# Patient Record
Sex: FEMALE | Race: WHITE
[De-identification: names, ages, dates, MRNs, and addresses within clinical notes are randomized per-mention and may not be internally consistent; named-entity substitution may affect disease eponyms.]

---

## 2020-10-09 ENCOUNTER — HOSPITAL ENCOUNTER (EMERGENCY)
Dept: HOSPITAL 95 - ER | Age: 70
Discharge: ANOTHER HOSPITAL | End: 2020-10-09
Payer: OTHER GOVERNMENT

## 2020-10-09 VITALS — WEIGHT: 214.99 LBS | HEIGHT: 60 IN | BODY MASS INDEX: 42.21 KG/M2

## 2020-10-09 DIAGNOSIS — Z88.0: ICD-10-CM

## 2020-10-09 DIAGNOSIS — Z79.899: ICD-10-CM

## 2020-10-09 DIAGNOSIS — Z20.828: ICD-10-CM

## 2020-10-09 DIAGNOSIS — R74.01: ICD-10-CM

## 2020-10-09 DIAGNOSIS — Z88.5: ICD-10-CM

## 2020-10-09 DIAGNOSIS — Z79.82: ICD-10-CM

## 2020-10-09 DIAGNOSIS — E11.9: ICD-10-CM

## 2020-10-09 DIAGNOSIS — Z88.1: ICD-10-CM

## 2020-10-09 DIAGNOSIS — K83.8: Primary | ICD-10-CM

## 2020-10-09 DIAGNOSIS — E11.10: ICD-10-CM

## 2020-10-09 LAB
ALBUMIN SERPL BCP-MCNC: 3.2 G/DL (ref 3.4–5)
ALBUMIN/GLOB SERPL: 0.8 {RATIO} (ref 0.8–1.8)
ALT SERPL W P-5'-P-CCNC: 796 U/L (ref 12–78)
ANION GAP SERPL CALCULATED.4IONS-SCNC: 21 MMOL/L (ref 6–16)
ANION GAP SERPL CALCULATED.4IONS-SCNC: 21 MMOL/L (ref 6–16)
AST SERPL W P-5'-P-CCNC: 612 U/L (ref 12–37)
BASOPHILS # BLD AUTO: 0.05 K/MM3 (ref 0–0.23)
BASOPHILS NFR BLD AUTO: 1 % (ref 0–2)
BILIRUB SERPL-MCNC: 3.7 MG/DL (ref 0.1–1)
BILIRUB UR QL STRIP: (no result)
BUN SERPL-MCNC: 33 MG/DL (ref 8–24)
BUN SERPL-MCNC: 34 MG/DL (ref 8–24)
CALCIUM SERPL-MCNC: 8.6 MG/DL (ref 8.5–10.1)
CALCIUM SERPL-MCNC: 9.4 MG/DL (ref 8.5–10.1)
CHLORIDE SERPL-SCNC: 104 MMOL/L (ref 98–108)
CHLORIDE SERPL-SCNC: 109 MMOL/L (ref 98–108)
CO2 SERPL-SCNC: 11 MMOL/L (ref 21–32)
CO2 SERPL-SCNC: 9 MMOL/L (ref 21–32)
CREAT SERPL-MCNC: 0.79 MG/DL (ref 0.4–1)
CREAT SERPL-MCNC: 0.85 MG/DL (ref 0.4–1)
DEPRECATED RDW RBC AUTO: 48.8 FL (ref 35.1–46.3)
EOSINOPHIL # BLD AUTO: 0.01 K/MM3 (ref 0–0.68)
EOSINOPHIL NFR BLD AUTO: 0 % (ref 0–6)
ERYTHROCYTE [DISTWIDTH] IN BLOOD BY AUTOMATED COUNT: 13.2 % (ref 11.7–14.2)
GLOBULIN SER CALC-MCNC: 4 G/DL (ref 2.2–4)
GLUCOSE SERPL-MCNC: 404 MG/DL (ref 70–99)
GLUCOSE SERPL-MCNC: 418 MG/DL (ref 70–99)
GLUCOSE UR-MCNC: (no result) MG/DL
HCT VFR BLD AUTO: 40.6 % (ref 33–51)
HGB BLD-MCNC: 12.1 G/DL (ref 11.5–16)
IMM GRANULOCYTES # BLD AUTO: 0.14 K/MM3 (ref 0–0.1)
IMM GRANULOCYTES NFR BLD AUTO: 2 % (ref 0–1)
KETONES UR STRIP-MCNC: (no result) MG/DL
LYMPHOCYTES # BLD AUTO: 0.78 K/MM3 (ref 0.84–5.2)
LYMPHOCYTES NFR BLD AUTO: 13 % (ref 21–46)
MCHC RBC AUTO-ENTMCNC: 29.8 G/DL (ref 31.5–36.5)
MCV RBC AUTO: 100 FL (ref 80–100)
MONOCYTES # BLD AUTO: 0.33 K/MM3 (ref 0.16–1.47)
MONOCYTES NFR BLD AUTO: 6 % (ref 4–13)
NEUTROPHILS # BLD AUTO: 4.73 K/MM3 (ref 1.96–9.15)
NEUTROPHILS NFR BLD AUTO: 78 % (ref 41–73)
NRBC # BLD AUTO: 0 K/MM3 (ref 0–0.02)
NRBC BLD AUTO-RTO: 0 /100 WBC (ref 0–0.2)
PLATELET # BLD AUTO: 266 K/MM3 (ref 150–400)
POTASSIUM SERPL-SCNC: 4.8 MMOL/L (ref 3.5–5.5)
POTASSIUM SERPL-SCNC: 5 MMOL/L (ref 3.5–5.5)
PROT SERPL-MCNC: 7.2 G/DL (ref 6.4–8.2)
PROT UR STRIP-MCNC: (no result) MG/DL
RBC #/AREA URNS HPF: (no result) /HPF (ref 0–2)
SODIUM SERPL-SCNC: 136 MMOL/L (ref 136–145)
SODIUM SERPL-SCNC: 139 MMOL/L (ref 136–145)
SP GR SPEC: 1.02 (ref 1–1.02)
UROBILINOGEN UR STRIP-MCNC: (no result) MG/DL
WBC #/AREA URNS HPF: (no result) /HPF (ref 0–5)

## 2021-01-18 ENCOUNTER — HOSPITAL ENCOUNTER (INPATIENT)
Dept: HOSPITAL 95 - ER | Age: 71
LOS: 4 days | Discharge: HOME HEALTH | DRG: 315 | End: 2021-01-22
Attending: INTERNAL MEDICINE | Admitting: HOSPITALIST
Payer: MEDICARE

## 2021-01-18 VITALS — WEIGHT: 179.9 LBS | HEIGHT: 60 IN | BODY MASS INDEX: 35.32 KG/M2

## 2021-01-18 DIAGNOSIS — C78.89: ICD-10-CM

## 2021-01-18 DIAGNOSIS — J44.9: ICD-10-CM

## 2021-01-18 DIAGNOSIS — I50.22: ICD-10-CM

## 2021-01-18 DIAGNOSIS — E11.22: ICD-10-CM

## 2021-01-18 DIAGNOSIS — Z87.891: ICD-10-CM

## 2021-01-18 DIAGNOSIS — I42.0: ICD-10-CM

## 2021-01-18 DIAGNOSIS — I13.0: ICD-10-CM

## 2021-01-18 DIAGNOSIS — N18.9: ICD-10-CM

## 2021-01-18 DIAGNOSIS — I25.2: ICD-10-CM

## 2021-01-18 DIAGNOSIS — T82.118A: Primary | ICD-10-CM

## 2021-01-18 DIAGNOSIS — Z79.4: ICD-10-CM

## 2021-01-18 DIAGNOSIS — I48.20: ICD-10-CM

## 2021-01-18 DIAGNOSIS — I70.90: ICD-10-CM

## 2021-01-18 LAB
ALBUMIN SERPL BCP-MCNC: 2 G/DL (ref 3.4–5)
ALBUMIN/GLOB SERPL: 0.6 {RATIO} (ref 0.8–1.8)
ALT SERPL W P-5'-P-CCNC: 11 U/L (ref 12–78)
ANION GAP SERPL CALCULATED.4IONS-SCNC: 5 MMOL/L (ref 6–16)
ANION GAP SERPL CALCULATED.4IONS-SCNC: 9 MMOL/L (ref 6–16)
AST SERPL W P-5'-P-CCNC: 18 U/L (ref 12–37)
BASOPHILS # BLD: 0 K/MM3 (ref 0–0.23)
BASOPHILS NFR BLD: 0 % (ref 0–2)
BILIRUB SERPL-MCNC: 0.7 MG/DL (ref 0.1–1)
BLASTS NFR BLD MANUAL: 1 % (ref 0–0)
BUN SERPL-MCNC: 19 MG/DL (ref 8–24)
BUN SERPL-MCNC: 20 MG/DL (ref 8–24)
CALCIUM SERPL-MCNC: 8.1 MG/DL (ref 8.5–10.1)
CALCIUM SERPL-MCNC: 8.4 MG/DL (ref 8.5–10.1)
CHLORIDE SERPL-SCNC: 105 MMOL/L (ref 98–108)
CHLORIDE SERPL-SCNC: 106 MMOL/L (ref 98–108)
CO2 SERPL-SCNC: 26 MMOL/L (ref 21–32)
CO2 SERPL-SCNC: 28 MMOL/L (ref 21–32)
CREAT SERPL-MCNC: 0.77 MG/DL (ref 0.4–1)
CREAT SERPL-MCNC: 0.81 MG/DL (ref 0.4–1)
DEPRECATED RDW RBC AUTO: 58.8 FL (ref 35.1–46.3)
DEPRECATED RDW RBC AUTO: 60.4 FL (ref 35.1–46.3)
EOSINOPHIL # BLD: 0 K/MM3 (ref 0–0.68)
EOSINOPHIL NFR BLD: 0 % (ref 0–6)
ERYTHROCYTE [DISTWIDTH] IN BLOOD BY AUTOMATED COUNT: 16.8 % (ref 11.7–14.2)
ERYTHROCYTE [DISTWIDTH] IN BLOOD BY AUTOMATED COUNT: 17 % (ref 11.7–14.2)
GLOBULIN SER CALC-MCNC: 3.1 G/DL (ref 2.2–4)
GLUCOSE SERPL-MCNC: 160 MG/DL (ref 70–99)
GLUCOSE SERPL-MCNC: 177 MG/DL (ref 70–99)
HCT VFR BLD AUTO: 17.7 % (ref 33–51)
HCT VFR BLD AUTO: 21.2 % (ref 33–51)
HGB BLD-MCNC: 5.6 G/DL (ref 11.5–16)
HGB BLD-MCNC: 6.7 G/DL (ref 11.5–16)
LYMPHOCYTES # BLD: 0.63 K/MM3 (ref 0.84–5.2)
LYMPHOCYTES NFR BLD: 7 % (ref 21–46)
MAGNESIUM SERPL-MCNC: 1.4 MG/DL (ref 1.6–2.4)
MCHC RBC AUTO-ENTMCNC: 31.6 G/DL (ref 31.5–36.5)
MCHC RBC AUTO-ENTMCNC: 31.6 G/DL (ref 31.5–36.5)
MCV RBC AUTO: 98 FL (ref 80–100)
MCV RBC AUTO: 98 FL (ref 80–100)
METAMYELOCYTES # BLD MANUAL: 0.36 K/MM3 (ref 0–0)
METAMYELOCYTES NFR BLD MANUAL: 4 % (ref 0–0)
MONOCYTES # BLD: 0.9 K/MM3 (ref 0.16–1.47)
MONOCYTES NFR BLD: 10 % (ref 4–13)
MYELOCYTES # BLD MANUAL: 0.63 K/MM3 (ref 0–0)
MYELOCYTES NFR BLD MANUAL: 7 % (ref 0–0)
NEUTS BAND NFR BLD MANUAL: 23 % (ref 0–8)
NEUTS SEG # BLD MANUAL: 6.23 K/MM3 (ref 1.96–9.15)
NEUTS SEG NFR BLD MANUAL: 46 % (ref 41–73)
NRBC # BLD AUTO: 0.06 K/MM3 (ref 0–0.02)
NRBC # BLD AUTO: 0.1 K/MM3 (ref 0–0.02)
NRBC BLD AUTO-RTO: 0.7 /100 WBC (ref 0–0.2)
NRBC BLD AUTO-RTO: 1.1 /100 WBC (ref 0–0.2)
PLATELET # BLD AUTO: 201 K/MM3 (ref 150–400)
PLATELET # BLD AUTO: 274 K/MM3 (ref 150–400)
POTASSIUM SERPL-SCNC: 3.5 MMOL/L (ref 3.5–5.5)
POTASSIUM SERPL-SCNC: 3.7 MMOL/L (ref 3.5–5.5)
PROMYELOCYTES # BLD MANUAL: 0.18 K/MM3 (ref 0–0)
PROMYELOCYTES NFR BLD MANUAL: 2 % (ref 0–0)
PROT SERPL-MCNC: 5.1 G/DL (ref 6.4–8.2)
SODIUM SERPL-SCNC: 139 MMOL/L (ref 136–145)
SODIUM SERPL-SCNC: 140 MMOL/L (ref 136–145)
TOTAL CELLS COUNTED BLD: 100
TROPONIN I SERPL-MCNC: <0.015 NG/ML (ref 0–0.04)

## 2021-01-18 PROCEDURE — G0378 HOSPITAL OBSERVATION PER HR: HCPCS

## 2021-01-18 PROCEDURE — A9270 NON-COVERED ITEM OR SERVICE: HCPCS

## 2021-01-18 NOTE — NUR
PT ARRIVES TO ICU 16 VIA GURNEY FROM ER. DX DEFIBRILLATOR DISCHARGE. PT SPOUSE
ARRIVES TO ROOM WITH HER. SHE STATES THAT SHE HAS CHRONIC NAUSEA WHICH HAS
WORSENED WITH CHEMO. SHE HAS NEUROPATHY WHICH HAS WORSENED WITH CHEMO. SHE
STATES THAT SHE BROKE HER "TAILBONE" AS A CHILD AND SINCE THAT TIME, SHE HAS
TO BE SURE THAT SHE DOESN'T HAVE PRESSURE ON IT FOR PAIN CONTROL HOWEVER SHE
HAS BEEN CONFINED TO A WHEELCHAIR AFTER CHEMO HAS CAUSED INCREASING WEAKNESS.
SMALL PRESSURE ULCER IS NOTED TO COCCYX. PT IS IN AFIB ON ARRIVAL RATE NEAR
100, PRESSURE IS HYPOTENSIVE BUT MAP MAINTAINS, NO EDEMA IS NOTED TO BILAT
LOWER EXTREMITIES, LUNGS ARE CLEAR THROUGHOUT. PT DENIES PAIN, DENIES
CP/PRESSURE, ADMITS TO NAUSEA.

## 2021-01-18 NOTE — NUR
ASSUMED CARE
 
RECEIVED REPORT FROM JANICE LEE. PT IS LYING IN BED WITH BLANKET OVER FACE, AS
SHE IS SENSITIVE TO LIGHT. SHE IS NAUSEOUS, BUT DENIES PAIN AND SOB. ZOFRAN
GIVEN. SHE IS INTERMITTENTLY TACHYCARDIC, BP SOFT, BUT MAP IS 69. BED LOW AND
LOCKED. CALL LIGHT WITHIN REACH.

## 2021-01-18 NOTE — NUR
Pt seen twice this afternoon caregfully reviewed her diagnositics. They are
wanting to discuss plans of care with oncologist. Also review with them her
cardiac status and code status. They demonstrated understanding. will follow
up tomorrow after oncology sees. them.

## 2021-01-18 NOTE — NUR
ADMIT: 1/18/21 DISCHARGE: DX:Abnormal EKG/V. tach/SVTs CC:
 
HIEU CALL:
 
RESIDENCE: Home with spouse
 
CAREGIVER: Yovany Alford, Child, [phone]
Inderjit Alford, Spouse / Partner, [phone]
 
DX:Type 2 DM, Vtach, Stage 4 liver and pancreatic cancer, see list
 
DME: DM supplies
 
CCM: Referral- 2017
 
HOME HEALTH: None
 
SUMMARY:
1/18/21- Per chart review with Dr. Royal, he is working on trying to
figure out why she has anemia. Spoke with pallative care nurse, she stated
that Dr. Lennon will be coming to visit the patient tomorrow to discuss if
her treatment was for curing her clean of cancer or if it is for pallative
reasons. She also reports that the cardiologist is considering turning off her
defibrillator and  pt transfer to pallative care/comfort care. Will base this
off meeting with Dr. Lennon tomorrow. -kjw
1: Defibrillator discharge
A/P: - tele
- cardiology consult in am
- replace electrolytes.
 
2: Anemia
A/P: Hb 5.6
Likely sec to chemo.
- no bloody/tarry bowel movement reported.
- She is Jew, does not want blood product transfusion.
- will administer, iv iron and folate.
 
3: Afib
A/P: Not on AC because of nose bleeds
Resume home med regimen.
 
4: Systolic CHF, chronic
A/P: stable
Resume home med regimen.
ejection fraction estimated at 35%. Grade 2 left
ventricular diastolic dysfunction
 
5: Diabetes
A/P: Resume home med regimen.
lantus 10 units bid
 
6: Cancer
A/P: Stage 4 liver and pancreas cancer
 
7: COPD (chronic obstructive pulmonary disease)
A/P: stable
Continue nebs/breathing treatment

## 2021-01-18 NOTE — NUR
UPDATE
 
PT UP TO MEDICAL FLOOR, DRY ATTENDS, ALERT AND ORIENTED X 4, AND DENIES ANY
MAJOR PAIN - JUST FEELING "SORE" ALL OVER. TRANFERRED VIA BED. HAVE NOT HEARD
FROM CT/IMAGING. SHE STILL NEEDS TO GET HER CT SCAN - I DID REPORT OFF TO MED
NURSE - JANICE ARTHUR. DR. VASQUEZ WILL MEET HER TOMORROW TO DISCUSS PLAN OF CARE
WITH PT AND HER  TOMORROW MORNING. DR. PUENTE, THE PT's ONCOLOGIST,
HAS INFORMED US HE WILL BE COMING TOMORROW TO STOP BY AND SEE THE PT AND TO
SEE HER CT SCAN RESULTS.
 
HR HAS BEEN SOFT, BUT MAP > 60. NO OBVIOUS SIGNS OF BLEEDING. PT IN AFIB, RATE
IS IN THE 80-90s, BUT SOMETIMES SHOOTS UP TO 120s FOR SHORT PERIODS OF TIME,
AND THEN SHE RECOVERS WELL. ON ROOM AIR, WITH SPO2 94%+. AFEBIRLE TODAY.
ZOFRAN HELPED WITH NAUSEA, PT WAS ABLE TO INGEST SOME FLUIDS, BUT NOT MUCH.

## 2021-01-18 NOTE — NUR
PT NOTED WITH RATE INCREASE TO 180S, AICD DELIVERED SHOCK, PT C/O PAIN WITH
THIS, SEE TELEMETRY STRIP. OTHERWISE NO CHANGES SINCE ADMIT AT 0450.

## 2021-01-18 NOTE — NUR
ASSUMED CARE OF PATIENT AT 1745 UPON HER ARRIVAL FROM ICU. DENIES PAIN AT THIS
TIME. TELEMETRY PLACED AND RATE & RHYTHM CONFIRMED WITH CARLITO IN PCU. GIVEN
SOFT TOUCH CALL LIGHT AS SHE STATES SHE HAS TROUBLE PUSHING THE BUTTONS ON
CALL LIGHT. TV TURNED ON TO HER FAVORITE CHANNEL. HAS ICE WATER, REQUESTED
JELLO. WAS CHANGED TO CLEAR LIQ DIET D/T PERSISTENT NAUSEA. WANTS TO BE KEPT
ON HER L SIDE D/T PRESSURE ULCER ON COCCYX.

## 2021-01-18 NOTE — NUR
Pt spoke with physician from [facility] about her anemia and cancer care. Pt
was trying to eat after visit. Assested with her meal and some therputic time
with patient. She is fearfull of eating because of the nausea. Pt states she
has has had great decline the past few weeks. She has not been able to show
for a month and has lost over forty pounds. She has nott been able to get up
and around without high risk of a fall.
  Pt not tolerating po intake with bouts of significant nausea and loose
stools. She also states she cannot take any pressure on her tailbone due to an
old injury so struggling with using a commode or toilet. States her  is
doing most of her care. She has a son and DIL that live very close by and are
willing to help.
  Pt able totolerat some broth and canned fruit with minimal nausea. She was
very talkative about her care. She states she does not know if her tratment is
curative or just "holding the cancer back". We discussed her ICD and the
risks. She keeps comining back to hoping the cancer witll get better. She
stated she did not want to make any  decisions now. She was focused on getting
her  some help.
  reassured her that we would help her get some reliev from her symptoms and
formulate a plan. reenforced the risks of sudden death from her cardiac
dysfunction. Want us to meet with her  and help him understand. Advised
nursing to contact me when her arrives.

## 2021-01-19 LAB
ALBUMIN SERPL BCP-MCNC: 1.8 G/DL (ref 3.4–5)
ALBUMIN/GLOB SERPL: 0.7 {RATIO} (ref 0.8–1.8)
ALT SERPL W P-5'-P-CCNC: 13 U/L (ref 12–78)
ANION GAP SERPL CALCULATED.4IONS-SCNC: 10 MMOL/L (ref 6–16)
AST SERPL W P-5'-P-CCNC: 21 U/L (ref 12–37)
BASOPHILS # BLD: 0 K/MM3 (ref 0–0.23)
BASOPHILS NFR BLD: 0 % (ref 0–2)
BILIRUB SERPL-MCNC: 0.5 MG/DL (ref 0.1–1)
BUN SERPL-MCNC: 23 MG/DL (ref 8–24)
CALCIUM SERPL-MCNC: 7.8 MG/DL (ref 8.5–10.1)
CHLORIDE SERPL-SCNC: 103 MMOL/L (ref 98–108)
CO2 SERPL-SCNC: 24 MMOL/L (ref 21–32)
CREAT SERPL-MCNC: 0.78 MG/DL (ref 0.4–1)
DEPRECATED RDW RBC AUTO: 61.4 FL (ref 35.1–46.3)
EOSINOPHIL # BLD: 0 K/MM3 (ref 0–0.68)
EOSINOPHIL NFR BLD: 0 % (ref 0–6)
ERYTHROCYTE [DISTWIDTH] IN BLOOD BY AUTOMATED COUNT: 17.2 % (ref 11.7–14.2)
GLOBULIN SER CALC-MCNC: 2.7 G/DL (ref 2.2–4)
GLUCOSE SERPL-MCNC: 385 MG/DL (ref 70–99)
HCT VFR BLD AUTO: 22 % (ref 33–51)
HGB BLD-MCNC: 6.9 G/DL (ref 11.5–16)
LYMPHOCYTES # BLD: 1.85 K/MM3 (ref 0.84–5.2)
LYMPHOCYTES NFR BLD: 12 % (ref 21–46)
MAGNESIUM SERPL-MCNC: 1.8 MG/DL (ref 1.6–2.4)
MCHC RBC AUTO-ENTMCNC: 31.4 G/DL (ref 31.5–36.5)
MCV RBC AUTO: 100 FL (ref 80–100)
METAMYELOCYTES # BLD MANUAL: 0.61 K/MM3 (ref 0–0)
METAMYELOCYTES NFR BLD MANUAL: 4 % (ref 0–0)
MONOCYTES # BLD: 0 K/MM3 (ref 0.16–1.47)
MONOCYTES NFR BLD: 0 % (ref 4–13)
MYELOCYTES # BLD MANUAL: 1.69 K/MM3 (ref 0–0)
MYELOCYTES NFR BLD MANUAL: 11 % (ref 0–0)
NEUTS BAND NFR BLD MANUAL: 11 % (ref 0–8)
NEUTS SEG # BLD MANUAL: 10.66 K/MM3 (ref 1.96–9.15)
NEUTS SEG NFR BLD MANUAL: 58 % (ref 41–73)
NRBC # BLD AUTO: 0.19 K/MM3 (ref 0–0.02)
NRBC BLD AUTO-RTO: 1.2 /100 WBC (ref 0–0.2)
PHOSPHATE SERPL-MCNC: 2 MG/DL (ref 2.5–4.9)
PLATELET # BLD AUTO: 224 K/MM3 (ref 150–400)
POTASSIUM SERPL-SCNC: 3.3 MMOL/L (ref 3.5–5.5)
PROMYELOCYTES # BLD MANUAL: 0.61 K/MM3 (ref 0–0)
PROMYELOCYTES NFR BLD MANUAL: 4 % (ref 0–0)
PROT SERPL-MCNC: 4.5 G/DL (ref 6.4–8.2)
SODIUM SERPL-SCNC: 137 MMOL/L (ref 136–145)
TOTAL CELLS COUNTED BLD: 100

## 2021-01-19 NOTE — NUR
SHIFT SUMMARY.
A&OX3, PLEASANT AND COOPERATIVE WITH CARE. PT IS BED/ W/C BOUND AT BASELINE,
PT WORKED WITH PT/OT AND WAS ABLE TO STAND WITH MIN ASSIST AT THE BEDSIDE FOR
ABOUT 3 SECONDS BEFORE NEEDING TO SIT BACK DOWN ON THE BED, OT REPORTED THAT
IT WAS NOT LONG ENOUGH FOR A PIVOT TRANSFER THAT SHE IS USUALLY ABLE TO
ACHIEVE IN THE HOME SETTING. PT WITH ONE EPISODE OF NAUSEA, DRY HEAVES WITHOUT
EMESIS, THIS RESOLVED WITH ZOFRAN 8MG IV ONCE. PT C/O PAIN TO ELEANOR AREA,
RECTUM R/T SORENESS FROM INCONTINENCE AND DIARHEA, BARRIER CREAM APPLIED WITH
EACH BREIF CHANGE, BED BATH COMPLETED THIS AM.
PT REPORTED PAIN TO UPPER ABD WITH MOVEMENT, PT DENIED THE
NEED FOR PAIN MEDICATION. PT WITH LIQUID STOOLS THROGHOUT THE SHIFT, CDIFF
NEG, RECIEVED ORDER FOR IMODIUM AND FIRST DOSE GIVEN. PT REPORTED THIS EVENING
DURING LAST BRIEF CHANGE OF INCONTINENT STOOL, "I SHOULD PROBABLY GO TO
FACILTIY." PT ALSO REPORTED THAT SHE IS CONSIDERING NOT CONTINUING WITH CHEMO
FOR TREATMENT OF CANCER.  AT BEDSIDE MOST OF THE SHIFT, 
ASSISTING WITH SOME CARE INTERMITTENTLY,  IS PRIMARY CAREGIVER IN THE
HOME. CT ABD COMPELTED THIS AM. NO OTHER CHANGES OR CONCERNS.

## 2021-01-19 NOTE — NUR
SHIFT SUMMARY
ADMITTED FOR DEFIBRILLATOR DISCHARGE. STAGE 4 PANCREATIC CANCER W/METS TO
LIVER. FULL CODE. DEFIBRILLATOR SETTINGS HAVE BEEN CHANGED. PT WAS NAUSEOUS
THROUGHOUT SHIFT, NAUSEA MEDICATION UPDATED IN EMAR FOR INCREASED DOSE AND
FREQUENCY. FREQUENT LOOSE BOWEL MOVEMENTS.  BARRIER CREAM APPLIED TO SKIN IN
GROIN AND BUTTOCKS. TELEMETRY: AFIB @ 114 BPM. Q 2 TURNS MORE CHALLENGING TO
PERFORM AS THIS PT ONLY WANTS TO BE ON HER LEFT SIDE ONLY. SHE IS A JEHOVAH'S
WITNESS AND REFUSES BLOOD TRANSFUSIONS.  IRON AND ALT TX'S HAVE BEEN
ADMINISTERED. PALLIATIVE CARE AND ONCOLOGY CONSULT WILL MEET WITH PT TODAY FOR
FUTURE PLAN OF CARE.

## 2021-01-20 LAB
ANION GAP SERPL CALCULATED.4IONS-SCNC: 9 MMOL/L (ref 6–16)
BASOPHILS # BLD AUTO: 0.02 K/MM3 (ref 0–0.23)
BASOPHILS # BLD: 0 K/MM3 (ref 0–0.23)
BASOPHILS NFR BLD AUTO: 0 % (ref 0–2)
BASOPHILS NFR BLD: 0 % (ref 0–2)
BUN SERPL-MCNC: 25 MG/DL (ref 8–24)
CALCIUM SERPL-MCNC: 7.9 MG/DL (ref 8.5–10.1)
CHLORIDE SERPL-SCNC: 102 MMOL/L (ref 98–108)
CO2 SERPL-SCNC: 24 MMOL/L (ref 21–32)
CREAT SERPL-MCNC: 0.74 MG/DL (ref 0.4–1)
DEPRECATED RDW RBC AUTO: 61.9 FL (ref 35.1–46.3)
EOSINOPHIL # BLD AUTO: 0.01 K/MM3 (ref 0–0.68)
EOSINOPHIL # BLD: 0 K/MM3 (ref 0–0.68)
EOSINOPHIL NFR BLD AUTO: 0 % (ref 0–6)
EOSINOPHIL NFR BLD: 0 % (ref 0–6)
ERYTHROCYTE [DISTWIDTH] IN BLOOD BY AUTOMATED COUNT: 17.5 % (ref 11.7–14.2)
GLUCOSE SERPL-MCNC: 423 MG/DL (ref 70–99)
HCT VFR BLD AUTO: 22 % (ref 33–51)
HGB BLD-MCNC: 6.8 G/DL (ref 11.5–16)
IMM GRANULOCYTES # BLD AUTO: 5.33 K/MM3 (ref 0–0.1)
IMM GRANULOCYTES NFR BLD AUTO: 26 % (ref 0–1)
LYMPHOCYTES # BLD AUTO: 1.66 K/MM3 (ref 0.84–5.2)
LYMPHOCYTES # BLD: 0.8 K/MM3 (ref 0.84–5.2)
LYMPHOCYTES NFR BLD AUTO: 8 % (ref 21–46)
LYMPHOCYTES NFR BLD: 4 % (ref 21–46)
MCHC RBC AUTO-ENTMCNC: 30.9 G/DL (ref 31.5–36.5)
MCV RBC AUTO: 99 FL (ref 80–100)
METAMYELOCYTES # BLD MANUAL: 2.02 K/MM3 (ref 0–0)
METAMYELOCYTES NFR BLD MANUAL: 10 % (ref 0–0)
MONOCYTES # BLD AUTO: 2.49 K/MM3 (ref 0.16–1.47)
MONOCYTES # BLD: 0.4 K/MM3 (ref 0.16–1.47)
MONOCYTES NFR BLD AUTO: 12 % (ref 4–13)
MONOCYTES NFR BLD: 2 % (ref 4–13)
MYELOCYTES # BLD MANUAL: 0.6 K/MM3 (ref 0–0)
MYELOCYTES NFR BLD MANUAL: 3 % (ref 0–0)
NEUTROPHILS # BLD AUTO: 10.69 K/MM3 (ref 1.96–9.15)
NEUTROPHILS NFR BLD AUTO: 53 % (ref 41–73)
NEUTS BAND NFR BLD MANUAL: 10 % (ref 0–8)
NEUTS SEG # BLD MANUAL: 15.35 K/MM3 (ref 1.96–9.15)
NEUTS SEG NFR BLD MANUAL: 66 % (ref 41–73)
NRBC # BLD AUTO: 0.48 K/MM3 (ref 0–0.02)
NRBC BLD AUTO-RTO: 2.4 /100 WBC (ref 0–0.2)
PLATELET # BLD AUTO: 232 K/MM3 (ref 150–400)
POTASSIUM SERPL-SCNC: 3.5 MMOL/L (ref 3.5–5.5)
PROMYELOCYTES # BLD MANUAL: 1.01 K/MM3 (ref 0–0)
PROMYELOCYTES NFR BLD MANUAL: 5 % (ref 0–0)
SODIUM SERPL-SCNC: 135 MMOL/L (ref 136–145)
TOTAL CELLS COUNTED BLD: 100

## 2021-01-20 NOTE — NUR
Spiritual care note:
 
Mrs. Shah appears quite ill and very weak.  She had trouble staying awake
for conversation.  Apparently, she and her  were told by physician that
it was time to make a decision about plan going forward: Either continue to
fight the cancer (which would require going to rehab--she does NOT want to go
anywhere but home.) Or home with hospice.  Both pt and spouse don't know what
to do.  They would like to have a face-to-face meeting tommorow with son in
room.  I provided information on hospice services and benefit and both
appeared to like this idea.  Still, they want son's input.  They are Jehovah's
witness, so declined prayer.  I will remain available.

## 2021-01-20 NOTE — NUR
SHIFT SUMMARY
ADMITTED FOR DEFIBRILLATOR DISCHARGE. DEFIBRILLATOR SETTINGS HAVE BEEN
CHANGED. ONCOLOGY CONSULT DR PUENTE WILL MEET WITH THE PT AS OUTPT. PT HAS
PANCREATIC CANCER W/METS TO LIVER. FULL LIQUID DIET. PT DID VOMIT UP PAIN
MEDICATION ONE TIME THIS SHIFT. SHE STATED SHE FELT FINE 30 MINUTES LATER WHEN
I CHECKED ON HER. SHE WILL NOT LET US POSITION HER ON LEFT SIDE OR BACK. SHE
REFUSES A MEPILEX COVER FOR HER COCCYX. CLINIMIX IS INFUSING @ 100 ML/HR. SHE
IS A [employer] AND WILL NOT ACCEPT BLOOD TRANSFUSIONS.

## 2021-01-20 NOTE — NUR
SHIFT SUMMARY
PT AOX4; CALLS APPROPRIATELY. CALLED PALLIATIVE CARE THIS AFTERNOON TO TALK
WITH PT. PT WAS HAVING A DIFFICULT TIME DECIDING IF SHE WANTS TO CONTINUE HER
CHEMO; PT HAS PANCREATIC METASTATIC CANCER.  PT SON AND FAMILY WILL TALK
TOMORROW AND MAKE A DECISION. PT WAS CRYING AND STATED "I DONT KNOW WHAT IS
BEST AT THIS TIME, AND I DON'T KNOW  IF I WANT CHEMO OR NOT, I WANT TO TALK TO
MY FAMILY "  WAS AT BEDSIDE. PALLIATIVE HAD A CHANCE TO TALKED TO PT
ABOUT THIS. PT IS ON 1L OF O2 FOR SUPPLEMENTAL R/T H&H PER RT. PT DENIES ANY
PAIN DURING THIS SHIFT OR CP.  DC TELE. PT USES BEDPAN AND MEPELEX CHANGED
TODAY FOR PROTECTION ON BUTTOCKS AREA. BED ALARM IS ON AND CALL LIGHT WITHIN
REACH.

## 2021-01-21 LAB
ANION GAP SERPL CALCULATED.4IONS-SCNC: 6 MMOL/L (ref 6–16)
BASOPHILS # BLD: 0 K/MM3 (ref 0–0.23)
BASOPHILS NFR BLD: 0 % (ref 0–2)
BUN SERPL-MCNC: 34 MG/DL (ref 8–24)
CALCIUM SERPL-MCNC: 7.9 MG/DL (ref 8.5–10.1)
CHLORIDE SERPL-SCNC: 101 MMOL/L (ref 98–108)
CO2 SERPL-SCNC: 26 MMOL/L (ref 21–32)
CREAT SERPL-MCNC: 0.65 MG/DL (ref 0.4–1)
DEPRECATED RDW RBC AUTO: 63.8 FL (ref 35.1–46.3)
EOSINOPHIL # BLD: 0 K/MM3 (ref 0–0.68)
EOSINOPHIL NFR BLD: 0 % (ref 0–6)
ERYTHROCYTE [DISTWIDTH] IN BLOOD BY AUTOMATED COUNT: 18.4 % (ref 11.7–14.2)
GLUCOSE SERPL-MCNC: 381 MG/DL (ref 70–99)
HCT VFR BLD AUTO: 22.2 % (ref 33–51)
HGB BLD-MCNC: 7 G/DL (ref 11.5–16)
LYMPHOCYTES # BLD: 1.2 K/MM3 (ref 0.84–5.2)
LYMPHOCYTES NFR BLD: 6 % (ref 21–46)
MCHC RBC AUTO-ENTMCNC: 31.5 G/DL (ref 31.5–36.5)
MCV RBC AUTO: 100 FL (ref 80–100)
METAMYELOCYTES # BLD MANUAL: 0.8 K/MM3 (ref 0–0)
METAMYELOCYTES NFR BLD MANUAL: 4 % (ref 0–0)
MONOCYTES # BLD: 1.2 K/MM3 (ref 0.16–1.47)
MONOCYTES NFR BLD: 6 % (ref 4–13)
MYELOCYTES # BLD MANUAL: 1 K/MM3 (ref 0–0)
MYELOCYTES NFR BLD MANUAL: 5 % (ref 0–0)
NEUTS BAND NFR BLD MANUAL: 5 % (ref 0–8)
NEUTS SEG # BLD MANUAL: 14.8 K/MM3 (ref 1.96–9.15)
NEUTS SEG NFR BLD MANUAL: 69 % (ref 41–73)
NRBC # BLD AUTO: 0.48 K/MM3 (ref 0–0.02)
NRBC BLD AUTO-RTO: 2.4 /100 WBC (ref 0–0.2)
PLATELET # BLD AUTO: 221 K/MM3 (ref 150–400)
POTASSIUM SERPL-SCNC: 4 MMOL/L (ref 3.5–5.5)
PROMYELOCYTES # BLD MANUAL: 1 K/MM3 (ref 0–0)
PROMYELOCYTES NFR BLD MANUAL: 5 % (ref 0–0)
SODIUM SERPL-SCNC: 133 MMOL/L (ref 136–145)
TOTAL CELLS COUNTED BLD: 100

## 2021-01-21 NOTE — NUR
Received call from Bedside RN Yvonne reporting Pt would like to change her
code status to DNR.
 
Pt resting in bed upon arrival. Engaged in therapeutic discussion regarding
code status. Pt reports she would like to be DNR. Re-enforced education of
meaning of DNR with V/U made by Pt. Discussed considering completing POLST
with Pt agreeable. Re-enforced education on life sustaining measures including
risk factors. Educated on each section to complete and consider. Assisted Pt
with completing POLST. Pt chooses DNR and Limited Treatment. Engaged in
therapeutic listening as Pt discusses concerns regarding family. Pt states
being worried about her youngest son and worries he won't get to spend enough
time with her before she leaves this world. She reports this is the main
reason for pursuing continued cancer treatment. Validated concerns and
continued therapeutic listening. Discussed further regarding hospice as being
an option at any point that she is ready. Pt expresses appreciation of visit
and reports no other concerns at this time.
 
Dr Royal signs POLST. Placed code status order for DNR per V/O from Dr Royal.
 
Obtained copies of POLST and returned orginal to Pt. POLST placed in Pt's
belongings bag to go home with Pt. Delivered copy of POLST to [facility]
Coordinator Tracy. Will deliver copy of POLST to medical records.
 
Palliative Care will remain available.

## 2021-01-21 NOTE — NUR
Spoke with Bedside JANICE Russell, and Dr Royal prior to Pt visit. Discussed
case and plan for potential D/C wit SNF.
 
Pt resting in bed upon arrival with spouse at bedside. Engaged in therapeutic
discussion regarding plan of care. Listened as Pt reports wanting to continue
with chemo therapy. Discussed plan for SNF with Pt stating she will not go to
SNF. Discussed the importance of regaining strength and function in order to
continue treatement. Pt still adamanat with not going to SNF. Listened as
spouse reports having discussion with PC RN yesterday regarding hospice.
Answered questions regarding hospice. Pt reports no ready to consider hospice
at this time. Discussed code status. Educated on life sustaining treatment
including risk factors. Pt reports wanting to remain a full code. Continued
therapeutic listening and answered questions. Pt appears to have poor insight
and unrealistic expectations. Continued therapeutic listening. Pt and spouse
express appreciation of visit and report no other concerns at this time.
 
Palliative Care will remain available.

## 2021-01-21 NOTE — NUR
SHIFT SUMMARY
PT IN BED ALL DAY.  ASSISTED TO BEDPAN SEVERAL TIMES.  FAMILY IN THIS MORNING
FOR A MEETING ABOUT PTS PLAN ON DISCHARGE.  PT REPORTS THE DESICION WAS FOR PT
TO GO HOME AND NOT SNF BECAUSE SHE DIDN'T WANT TO GO THERE AND HER DAUGHTER IN
[place] WOULD HELP TAKE CARE OF HER.  SAID SHE WANTED TO CONTINUE TO PERSUE
TREATMENT.  PALLIATIVE CARE IN AND SPOKE WITH PT AND AND .  ACCORDING
TO PALLIATIVE CARE RN PT WANTED TO REMAIN A FULL CODE BUT WITHIN A SHORT TIME
PT REPORTED SHE WANTED TO BE A DNR.  NOTIFIED PALLIATIVE CARE AND THEY CAME
AND COMPLETED AND POLST WITH PT.  EXPLAINED TO PT SHE HAD A GOOD CHANCE OF NOT
EVER GETTING OOB AGAIN IF SHE DIDN'T GO TO SNF AND SHE REPLIED SHE WAS AWARE
OF THAT, SHE JUST NEEDED TO SEE DR. PUENTE ONE MORE TIME.  JIMFRAN GIVEN
PRIOR TO LUNCH AND WAS ABLE TO EAT A HALF AND PEANUT BUTTER SANDWICH.  DID
HAVE GAS PAINS AFTERWARD THAT SELF RESOLVED.

## 2021-01-21 NOTE — NUR
1/21/21  Per Dr Petersen, no eta for discharge at this time.
Palliative care updated poslt to limited treatment- DNR
Floor nurse Alexandra called me to update on possible DME needs at discharge.
Patient sleeping at time of visit today,  Updated white board in room
Left [place] physican expectations,  left alex letter with my number to
discuss DME needs
will stop by Friday to meet and discuss needs. cp

## 2021-01-21 NOTE — NUR
NIGHT SHIFT SUMMARY
PT AAOX3 AND PLEASANT. CALLS APPROPRIATELY FOR ASSISTANCE.  AT BEDSIDE
THROUGH THE NIGHT. CBG AT , RECIEVED 30 UNITS SEMGLEE AND 8 UNITS
HUMALOG PER SLIDING SCALE. PT REMAINS ON IV CLINIMIX. MEDICATED FOR ABD PAIN
X1 WITH NORCO WITH GOOD PAIN RELIEF. PT HAS BEEN ABLE TO SLEEP MOST OF THE
NIGHT. PT'S SON COMING IN LATER TODAY TO DISCUSS AS A FAMILY WHETHER PT SHOULD
CONTINUE CHEMO TREATMENTS OR TRANSITION TOWARD HOSPICE. VSS, WILL CONTINUE TO
MONITOR.

## 2021-01-22 LAB
ANION GAP SERPL CALCULATED.4IONS-SCNC: 7 MMOL/L (ref 6–16)
BASOPHILS # BLD: 0 K/MM3 (ref 0–0.23)
BASOPHILS NFR BLD: 0 % (ref 0–2)
BUN SERPL-MCNC: 32 MG/DL (ref 8–24)
CALCIUM SERPL-MCNC: 7.9 MG/DL (ref 8.5–10.1)
CHLORIDE SERPL-SCNC: 103 MMOL/L (ref 98–108)
CO2 SERPL-SCNC: 25 MMOL/L (ref 21–32)
CREAT SERPL-MCNC: 0.7 MG/DL (ref 0.4–1)
DEPRECATED RDW RBC AUTO: 66.6 FL (ref 35.1–46.3)
EOSINOPHIL # BLD: 0 K/MM3 (ref 0–0.68)
EOSINOPHIL NFR BLD: 0 % (ref 0–6)
ERYTHROCYTE [DISTWIDTH] IN BLOOD BY AUTOMATED COUNT: 19.1 % (ref 11.7–14.2)
FERRITIN SERPL-MCNC: 1125 NG/ML (ref 8–252)
GLUCOSE SERPL-MCNC: 84 MG/DL (ref 70–99)
HCT VFR BLD AUTO: 23.3 % (ref 33–51)
HGB BLD-MCNC: 7.3 G/DL (ref 11.5–16)
HGB RETIC QN AUTO: 32.8 PG (ref 28.2–36.6)
IMM RETICS NFR: 51.5 % (ref 2.3–16)
LYMPHOCYTES # BLD: 1.28 K/MM3 (ref 0.84–5.2)
LYMPHOCYTES NFR BLD: 7 % (ref 21–46)
MCHC RBC AUTO-ENTMCNC: 31.3 G/DL (ref 31.5–36.5)
MCV RBC AUTO: 100 FL (ref 80–100)
METAMYELOCYTES # BLD MANUAL: 0.36 K/MM3 (ref 0–0)
METAMYELOCYTES NFR BLD MANUAL: 2 % (ref 0–0)
MONOCYTES # BLD: 1.83 K/MM3 (ref 0.16–1.47)
MONOCYTES NFR BLD: 10 % (ref 4–13)
MYELOCYTES # BLD MANUAL: 1.1 K/MM3 (ref 0–0)
MYELOCYTES NFR BLD MANUAL: 6 % (ref 0–0)
NEUTS BAND NFR BLD MANUAL: 3 % (ref 0–8)
NEUTS SEG # BLD MANUAL: 13.21 K/MM3 (ref 1.96–9.15)
NEUTS SEG NFR BLD MANUAL: 69 % (ref 41–73)
NRBC # BLD AUTO: 0.35 K/MM3 (ref 0–0.02)
NRBC BLD AUTO-RTO: 1.9 /100 WBC (ref 0–0.2)
PLATELET # BLD AUTO: 189 K/MM3 (ref 150–400)
POTASSIUM SERPL-SCNC: 4 MMOL/L (ref 3.5–5.5)
PROMYELOCYTES # BLD MANUAL: 0.55 K/MM3 (ref 0–0)
PROMYELOCYTES NFR BLD MANUAL: 3 % (ref 0–0)
RETICS # AUTO: 0.2 M/MM3 (ref 0.02–0.11)
RETICS/RBC NFR AUTO: 8.65 % (ref 0.5–2.5)
SODIUM SERPL-SCNC: 135 MMOL/L (ref 136–145)
TIBC SERPL-MCNC: 144 UG/DL (ref 250–450)
TOTAL CELLS COUNTED BLD: 100

## 2021-01-22 NOTE — NUR
NIGHT SHIFT SUMMARY
PT WAS REPORTED SEVERE NAUSEA AT THE START OF THE SHIFT AND WAS GIVEN
PHENERGAN W NO RELIEF. AFTER ADDITIONAL ZOFRAN WAS GIVEN THE PT REPORTED
DECREASED NAUSEA AND REQUESTED ICE CREAM. THE PT HAS RESTED COMFORTABLY FOR
MOST OF THE SHIFT W  AT BEDSIDE. WCTM.

## 2021-01-22 NOTE — NUR
REVIEWED: stay, medications, discharge instructions and reviwed follow up
appointments, removed iv with not issues or complications, pt remains in bed
awaiting return of family who left to prepare for trip home, prescriptions
sent to [facility], pt states she has all medications already

## 2021-01-23 NOTE — NUR
SUMMARY:Admit 1/22/21
1/22/21  Discharge home with .  He will transport her,  new meds
at pharmacy.
Ordered Melani lift from jaxon Munoz today, to be delivered to home.
Ordered Home health  PT  [facility]  [facility]
with referral,
Discussed family support at home.  Radha will continue to see Dr Lennon for
treatment of CA at this time.   Follow up [facility] 1 week.   Discussed [place]
transition of care call on Monday or Tuesday.  cp
 
1/21/21 Per Dr Petersen, no eta for discharge at this time.
Palliative care updated poslt to limited treatment- DNR
Floor nurse Alexandra called me to update on possible DME needs at discharge.
Patient sleeping at time of visit today, Updated white board in room
Left [place] physican expectations, left alex letter with my number to
discuss DME needs
will stop by Friday to meet and discuss needs. cp